# Patient Record
Sex: FEMALE | Race: OTHER | URBAN - METROPOLITAN AREA
[De-identification: names, ages, dates, MRNs, and addresses within clinical notes are randomized per-mention and may not be internally consistent; named-entity substitution may affect disease eponyms.]

---

## 2020-02-28 ENCOUNTER — TELEPHONE (OUTPATIENT)
Dept: SURGERY | Age: 50
End: 2020-02-28

## 2020-02-28 NOTE — TELEPHONE ENCOUNTER
Query for records. Called Dr. Kwadwo Albarran office for [facility]. Called PCP/referring provider for office notes/mammo related to [address] upcoming appointment scheduled with Dr. Tierra Anand. Detailed message left with medical records department with our office fax and phone provided. Will await fax.

## 2020-03-09 ENCOUNTER — OFFICE VISIT (OUTPATIENT)
Dept: SURGERY | Age: 50
End: 2020-03-09

## 2020-03-09 VITALS
OXYGEN SATURATION: 96 % | HEIGHT: 62 IN | RESPIRATION RATE: 18 BRPM | SYSTOLIC BLOOD PRESSURE: 138 MMHG | DIASTOLIC BLOOD PRESSURE: 87 MMHG | WEIGHT: 226 LBS | BODY MASS INDEX: 41.59 KG/M2 | TEMPERATURE: 96.5 F | HEART RATE: 67 BPM

## 2020-03-09 DIAGNOSIS — N61.0 CELLULITIS OF LEFT BREAST: Primary | ICD-10-CM

## 2020-03-09 RX ORDER — BUPROPION HYDROCHLORIDE 150 MG/1
TABLET ORAL
COMMUNITY
Start: 2019-12-11

## 2020-03-09 RX ORDER — LISINOPRIL 20 MG/1
1 TABLET ORAL
COMMUNITY

## 2020-03-09 RX ORDER — ATENOLOL AND CHLORTHALIDONE TABLET 100; 25 MG/1; MG/1
TABLET ORAL
COMMUNITY

## 2020-03-09 RX ORDER — DICYCLOMINE HYDROCHLORIDE 20 MG/1
TABLET ORAL
COMMUNITY

## 2020-03-09 NOTE — PATIENT INSTRUCTIONS
If you have and questions or concerns about today's appointment, the verbal and/or written instructions you were given for follow up care, please call our office at [phone].      [facility] - [address], [address] [place] [address]  Office: [phone]   Fax: [phone]

## 2020-03-09 NOTE — PROGRESS NOTES
Ms. Tim Blanton is a 51year old woman with left breast cellulitis. She reports a previous episode, now with a more recent episode. Both times there was improvement with antibiotics. There is no family history of breast cancer. She reports a negative mammogram in 2019 from [address] [place] [address]. I do not have this report/images yet, we are requesting. She has a history of bilateral reduction at age 13. The area of concern is at the inframammary scar line    Breast/GYN history:    OB History    No obstetric history on file. Past Medical History:   Diagnosis Date    Anxiety     Hypertension     IBS (irritable bowel syndrome)     Thyroid disease        Past Surgical History:   Procedure Laterality Date    HX APPENDECTOMY      carcinoid tumor    HX BREAST REDUCTION         Current Outpatient Medications on File Prior to Visit   Medication Sig Dispense Refill    levonorgestreL (MIRENA) 20 mcg/24 hours (5 yrs) 52 mg IUD Mirena 20 mcg/24 hours (5 yrs) 52 mg intrauterine device   Take 1 device by intrauterine route.  lisinopriL (PRINIVIL, ZESTRIL) 20 mg tablet Take 1 Tab by mouth.  atenoloL-chlorthalidone (TENORETIC) 100-25 mg per tablet atenolol 100 mg-chlorthalidone 25 mg tablet   TAKE 1 TABLET BY MOUTH EVERY DAY      buPROPion XL (WELLBUTRIN XL) 150 mg tablet TAKE 1 TABLET BY MOUTH EVERY DAY IN THE MORNING      dicyclomine (BENTYL) 20 mg tablet dicyclomine 20 mg tablet   TAKE 1 BY MOUTH EVERY 6 HOURS AS NEEDED FOR BLOATING / CRAMPS/ DIARRHEA       No current facility-administered medications on file prior to visit.         No Known Allergies    Social History     Tobacco Use    Smoking status: Former Smoker    Smokeless tobacco: Never Used   Substance Use Topics    Alcohol use: Yes     Frequency: 2-4 times a month    Drug use: Not on file       Family History   Problem Relation Age of Onset    Heart Disease Mother     Heart Disease Father          ROS: positives are bolded  General: fevers, chills, night sweats, fatigue, weight loss, weight gain  GI: nausea, vomiting, abdominal pain, change in bowel habits, hematochezia, melena, GERD  Integ: dermatitis, abnormal moles  HEENT: visual changes, vertigo, epistaxis, dysphagia, hoarseness  Cardiac: chest pain, palpitations, HTN, edema, varicosities  Resp: cough, shortness of breath, wheezing, hemoptysis, snoring, reactive airway disease  : hematuria, dysuria, frequency, urgency, nocturia, stress urinary incontinence   MSK: weakness, joint pain, arthritis  Endocrine:  thyroid disease, polyuria, polydipsia, polyphagia, poor wound healing, heat intolerance, cold intolerance  Lymph/Heme: anemia, bruising, history of blood transfusions  Neuro: dizziness, headache, fainting, seizures, stroke  Psych: anxiety, depression    Physical Exam:  Visit Vitals  /87 (BP 1 Location: Right arm, BP Patient Position: Sitting)   Pulse 67   Temp 96.5 °F (35.8 °C) (Oral)   Resp 18   Ht 5' 2\" (1.575 m)   Wt 102.5 kg (226 lb)   SpO2 96%   BMI 41.34 kg/m²       Gen:  No distress  Head: normocephalic, atraumatic  Mouth: Clear, no overt lesions, oral mucosa pink and moist  Neck: supple, no masses, no adenopathy, trachea midline  Resp: clear bilaterally  Cardio: Regular rate and rhythm  Abdomen: soft, nontender, nondistended  Extremeties: warm, well-perfused  Neuro: sensation and strength grossly intact and symmetrical  Psych: alert and oriented to person, place and time  Breasts:   Right: Examined in both the supine and upright positions. There was no supraclavicular, infraclavicular, or axillary lympadenopathy. There were no dominant masses, no skin changes, no asymmetry identified well healed reduction pattern scars  Left: Examined in both the supine and upright positions. There was no supraclavicular, infraclavicular, or axillary lympadenopathy.    There were no dominant masses, no skin changes, no asymmetry identified well healed reduction pattern scars, inframammary scar central/lateral with approx 1cm resolving erythema      Imaging:  Per patient 2019 [facility] negative    Impression:  Patient Active Problem List   Diagnosis Code    Cellulitis of left breast N640      51year old woman with left breast cellulitis, improving on antibiotics. We will obtain imaging from [facility]. We discussed repeat imaging vs conservative management with close follow up. Ms. Deborah Woody prefers observation. Follow up 1 month. If this area fails to resolve, will proceed with imaging at that time. If it remains focal, biopsy and/or excision may be recommended. All questions were answered. She was asked to call with any additional questions or concerns.       cc: Uriel Canseco MD

## 2020-03-09 NOTE — LETTER
3/10/2020 8:04 AM 
 
Patient:  Bonita Norwood YOB: 1970 Date of Visit: 3/9/2020 Fawad Corona MD 
[facility] [address] [address] VIA Facsimile: [phone] Austin Goode MD 
[address] [address] VIA Facsimile: [phone] Dear MD Austin Orourke MD, 
 
 
I had the pleasure of seeing Ms. Nata Monreal in my office today for her breast cellulitis. I am including a copy of my office visit today. If you have questions, please do not hesitate to call me. I look forward to following Ms. Michael Vargas along with you and will keep you updated as to her progress. Sincerely, Paige Mackey MD

## 2020-03-10 PROBLEM — N61.0 CELLULITIS OF LEFT BREAST: Status: ACTIVE | Noted: 2020-03-10

## 2022-03-18 PROBLEM — N61.0 CELLULITIS OF LEFT BREAST: Status: ACTIVE | Noted: 2020-03-10

## 2024-11-12 ENCOUNTER — HOSPITAL ENCOUNTER (OUTPATIENT)
Facility: HOSPITAL | Age: 54
Setting detail: SPECIMEN
Discharge: HOME | End: 2024-11-15

## 2024-11-12 ENCOUNTER — OFFICE VISIT (OUTPATIENT)
Facility: CLINIC | Age: 54
End: 2024-11-12

## 2024-11-12 VITALS
DIASTOLIC BLOOD PRESSURE: 75 MMHG | HEART RATE: 61 BPM | WEIGHT: 221 LBS | RESPIRATION RATE: 15 BRPM | OXYGEN SATURATION: 95 % | HEIGHT: 62 IN | TEMPERATURE: 98 F | BODY MASS INDEX: 40.67 KG/M2 | SYSTOLIC BLOOD PRESSURE: 115 MMHG

## 2024-11-12 DIAGNOSIS — M79.671 RIGHT FOOT PAIN: ICD-10-CM

## 2024-11-12 DIAGNOSIS — F41.9 ANXIETY AND DEPRESSION: ICD-10-CM

## 2024-11-12 DIAGNOSIS — Z76.89 ENCOUNTER TO ESTABLISH CARE: Primary | ICD-10-CM

## 2024-11-12 DIAGNOSIS — Z12.31 ENCOUNTER FOR SCREENING MAMMOGRAM FOR MALIGNANT NEOPLASM OF BREAST: ICD-10-CM

## 2024-11-12 DIAGNOSIS — Z13.220 SCREENING FOR CHOLESTEROL LEVEL: ICD-10-CM

## 2024-11-12 DIAGNOSIS — I10 ESSENTIAL (PRIMARY) HYPERTENSION: ICD-10-CM

## 2024-11-12 DIAGNOSIS — Z13.89 SCREENING FOR BLOOD OR PROTEIN IN URINE: ICD-10-CM

## 2024-11-12 DIAGNOSIS — Z13.29 SCREENING FOR THYROID DISORDER: ICD-10-CM

## 2024-11-12 DIAGNOSIS — M76.61 ACHILLES TENDINITIS OF RIGHT LOWER EXTREMITY: ICD-10-CM

## 2024-11-12 DIAGNOSIS — Z13.1 SCREENING FOR DIABETES MELLITUS (DM): ICD-10-CM

## 2024-11-12 DIAGNOSIS — Z13.228 SCREENING FOR METABOLIC DISORDER: ICD-10-CM

## 2024-11-12 DIAGNOSIS — Z12.11 SCREENING FOR COLON CANCER: ICD-10-CM

## 2024-11-12 DIAGNOSIS — Z13.0 SCREENING FOR DEFICIENCY ANEMIA: ICD-10-CM

## 2024-11-12 DIAGNOSIS — F32.A ANXIETY AND DEPRESSION: ICD-10-CM

## 2024-11-12 LAB — SENTARA SPECIMEN COLLECTION: NORMAL

## 2024-11-12 PROCEDURE — 99001 SPECIMEN HANDLING PT-LAB: CPT

## 2024-11-12 RX ORDER — ATENOLOL AND CHLORTHALIDONE TABLET 100; 25 MG/1; MG/1
1 TABLET ORAL DAILY
Qty: 90 TABLET | Refills: 3 | Status: SHIPPED | OUTPATIENT
Start: 2024-11-12

## 2024-11-12 RX ORDER — ESCITALOPRAM OXALATE 20 MG/1
20 TABLET ORAL DAILY
Qty: 90 TABLET | Refills: 1 | Status: SHIPPED | OUTPATIENT
Start: 2024-11-12

## 2024-11-12 RX ORDER — ESCITALOPRAM OXALATE 20 MG/1
20 TABLET ORAL DAILY
COMMUNITY
End: 2024-11-12 | Stop reason: SDUPTHER

## 2024-11-12 RX ORDER — BUPROPION HYDROCHLORIDE 150 MG/1
150 TABLET ORAL EVERY MORNING
Qty: 90 TABLET | Refills: 1 | Status: SHIPPED | OUTPATIENT
Start: 2024-11-12

## 2024-11-12 SDOH — ECONOMIC STABILITY: FOOD INSECURITY: WITHIN THE PAST 12 MONTHS, YOU WORRIED THAT YOUR FOOD WOULD RUN OUT BEFORE YOU GOT MONEY TO BUY MORE.: NEVER TRUE

## 2024-11-12 SDOH — ECONOMIC STABILITY: INCOME INSECURITY: HOW HARD IS IT FOR YOU TO PAY FOR THE VERY BASICS LIKE FOOD, HOUSING, MEDICAL CARE, AND HEATING?: NOT HARD AT ALL

## 2024-11-12 SDOH — ECONOMIC STABILITY: FOOD INSECURITY: WITHIN THE PAST 12 MONTHS, THE FOOD YOU BOUGHT JUST DIDN'T LAST AND YOU DIDN'T HAVE MONEY TO GET MORE.: NEVER TRUE

## 2024-11-12 ASSESSMENT — PATIENT HEALTH QUESTIONNAIRE - PHQ9
SUM OF ALL RESPONSES TO PHQ QUESTIONS 1-9: 0
2. FEELING DOWN, DEPRESSED OR HOPELESS: NOT AT ALL
SUM OF ALL RESPONSES TO PHQ QUESTIONS 1-9: 0
SUM OF ALL RESPONSES TO PHQ9 QUESTIONS 1 & 2: 0
1. LITTLE INTEREST OR PLEASURE IN DOING THINGS: NOT AT ALL
SUM OF ALL RESPONSES TO PHQ QUESTIONS 1-9: 0
SUM OF ALL RESPONSES TO PHQ QUESTIONS 1-9: 0

## 2024-11-12 NOTE — PATIENT INSTRUCTIONS
[facility]  Address: [address] [place] [address]  Phone: [phone]

## 2024-11-12 NOTE — PROGRESS NOTES
Michelle Mon is a 54 y.o. year old female who presents today for   Chief Complaint   Patient presents with    Establish Care       \"Have you been to the ER, urgent care clinic since your last visit?  Hospitalized since your last visit?\"    No    “Have you seen or consulted any other health care providers outside our system since your last visit?”         Have you had a mammogram?”   [facility]    No breast cancer screening on file      “Have you had a pap smear?”    [facility]. Will request    Date of last Cervical Cancer screen (HPV or PAP): 12/16/2013     “Have you had a colorectal cancer screening such as a colonoscopy/FIT/Cologuard?    [facility]     No colonoscopy on file  No cologuard on file  No FIT/FOBT on file   No flexible sigmoidoscopy on file         Click Here for Release of Records Request    - JORGE Wilkerson  [facility]  Phone: [phone]  Fax: [phone]

## 2024-11-12 NOTE — PROGRESS NOTES
Michelle Mon is a 54 y.o. female  NEW patient, here for evaluation of the following chief complaint(s):  Chief Complaint   Patient presents with    Establish Care      Assessment and Plan  1. Encounter to establish care  2. Encounter for screening mammogram for malignant neoplasm of breast  -     LELE DIGITAL SCREEN W OR WO CAD BILATERAL; Future  3. Screening for colon cancer  4. Screening for diabetes mellitus (DM)  -     Hemoglobin A1C; Future  5. Screening for blood or protein in urine  -     Urinalysis with Microscopic; Future  6. Screening for metabolic disorder  -     Comprehensive Metabolic Panel; Future  7. Screening for cholesterol level  -     Lipid Panel; Future  8. Screening for thyroid disorder  -     TSH + Free T4 Panel; Future  9. Screening for deficiency anemia  -     CBC with Auto Differential; Future  10. Anxiety and depression  -     escitalopram (LEXAPRO) 20 MG tablet; Take 1 tablet by mouth daily, Disp-90 tablet, R-1Normal  -     buPROPion (WELLBUTRIN XL) 150 MG extended release tablet; Take 1 tablet by mouth every morning, Disp-90 tablet, R-1Normal  11. Essential (primary) hypertension  Comments:  controlled, no changes  Orders:  -     atenolol-chlorthalidone (TENORETIC) 100-25 MG per tablet; Take 1 tablet by mouth daily, Disp-90 tablet, R-3Normal  12. Achilles tendinitis of right lower extremity  -     [facility] - CIRILO Ross MD, Orthopedic Surgery(General/Foot & Ankle), [place] ([facility])  -     XR FOOT RIGHT (MIN 3 VIEWS); Future  13. Right foot pain  -     XR FOOT RIGHT (MIN 3 VIEWS); Future       Return in about 1 month (around 12/12/2024) for results, exam, 30.   HPI:   In office visit to [facility]. Pt is well. 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 She would like to see a specialist.     ROS:    General: negative for - chills, fever, weight changes or

## 2024-11-13 LAB
A/G RATIO: 2 RATIO (ref 1.1–2.6)
ALBUMIN: 4.3 G/DL (ref 3.5–5)
ALP BLD-CCNC: 59 U/L (ref 25–115)
ALT SERPL-CCNC: 20 U/L (ref 5–40)
ANION GAP SERPL CALCULATED.3IONS-SCNC: 10 MMOL/L (ref 3–15)
AST SERPL-CCNC: 18 U/L (ref 10–37)
BACTERIA: NEGATIVE
BASOPHILS ABSOLUTE: 0 K/UL (ref 0–0.2)
BASOPHILS RELATIVE PERCENT: 0 % (ref 0–2)
BILIRUB SERPL-MCNC: 0.7 MG/DL (ref 0.2–1.2)
BILIRUB SERPL-MCNC: NEGATIVE MG/DL
BLOOD: NEGATIVE
BUN BLDV-MCNC: 13 MG/DL (ref 6–22)
CALCIUM SERPL-MCNC: 9.6 MG/DL (ref 8.4–10.5)
CHLORIDE BLD-SCNC: 102 MMOL/L (ref 98–110)
CHOLESTEROL, TOTAL: 198 MG/DL (ref 110–200)
CHOLESTEROL/HDL RATIO: 4 (ref 0–5)
CLARITY, UA: CLEAR
CO2: 28 MMOL/L (ref 20–32)
COLOR, UA: YELLOW
CREAT SERPL-MCNC: 0.7 MG/DL (ref 0.5–1.2)
EOSINOPHIL # BLD: 1 % (ref 0–6)
EOSINOPHILS ABSOLUTE: 0.1 K/UL (ref 0–0.5)
EPITHELIAL CELLS: ABNORMAL /HPF
ESTIMATED AVERAGE GLUCOSE: 105 MG/DL (ref 91–123)
GFR, ESTIMATED: >60 ML/MIN/1.73 SQ.M.
GLOBULIN: 2.1 G/DL (ref 2–4)
GLUCOSE: 99 MG/DL (ref 70–99)
GLUCOSE: NEGATIVE MG/DL
HBA1C MFR BLD: 5.3 % (ref 4.8–5.6)
HCT VFR BLD CALC: 40.6 % (ref 35.1–48)
HDLC SERPL-MCNC: 50 MG/DL
HEMOGLOBIN: 13.8 G/DL (ref 11.7–16)
HYALINE CASTS: ABNORMAL /LPF (ref 0–2)
KETONES, URINE: NEGATIVE MG/DL
LDL CHOLESTEROL: 97 MG/DL (ref 50–99)
LDL/HDL RATIO: 2
LEUKOCYTE ESTERASE, URINE: ABNORMAL
LYMPHOCYTES # BLD: 39 % (ref 20–45)
LYMPHOCYTES ABSOLUTE: 2.6 K/UL (ref 1–4.8)
MCH RBC QN AUTO: 33 PG (ref 26–34)
MCHC RBC AUTO-ENTMCNC: 34 G/DL (ref 31–36)
MCV RBC AUTO: 96 FL (ref 80–99)
MONOCYTES ABSOLUTE: 0.4 K/UL (ref 0.1–1)
MONOCYTES: 6 % (ref 3–12)
NEUTROPHILS ABSOLUTE: 3.6 K/UL (ref 1.8–7.7)
NEUTROPHILS: 53 % (ref 40–75)
NITRITE, URINE: NEGATIVE
NON-HDL CHOLESTEROL: 148 MG/DL
PDW BLD-RTO: 13.4 % (ref 10–15.5)
PH, URINE: 6.5 PH (ref 5–8)
PLATELET # BLD: 370 K/UL (ref 140–440)
PMV BLD AUTO: 10.8 FL (ref 9–13)
POTASSIUM SERPL-SCNC: 3.9 MMOL/L (ref 3.5–5.5)
PROTEIN UA: NEGATIVE MG/DL
RBC # BLD: 4.24 M/UL (ref 3.8–5.2)
RBC URINE: ABNORMAL /HPF
SODIUM BLD-SCNC: 140 MMOL/L (ref 133–145)
SPECIFIC GRAVITY UA: 1.01 (ref 1–1.03)
T4 FREE: 1.2 NG/DL (ref 0.9–1.8)
TOTAL PROTEIN: 6.4 G/DL (ref 6.4–8.3)
TRIGL SERPL-MCNC: 251 MG/DL (ref 40–149)
TSH SERPL DL<=0.05 MIU/L-ACNC: 0.4 MCU/ML (ref 0.27–4.2)
UROBILINOGEN, URINE: 0.2 MG/DL
VLDLC SERPL CALC-MCNC: 50 MG/DL (ref 8–30)
WBC # BLD: 6.7 K/UL (ref 4–11)
WBC UA: ABNORMAL /HPF (ref 0–5)

## 2024-12-17 ENCOUNTER — OFFICE VISIT (OUTPATIENT)
Age: 54
End: 2024-12-17

## 2024-12-17 VITALS — WEIGHT: 220 LBS | HEIGHT: 62 IN | BODY MASS INDEX: 40.48 KG/M2

## 2024-12-17 DIAGNOSIS — M67.88 ACHILLES TENDINOSIS: Primary | ICD-10-CM

## 2024-12-17 NOTE — PROGRESS NOTES
Michelle BLACKWELL Elieser   [address] [facility] [address]       DIAGNOSTIC IMAGING      Orders Placed This Encounter   Procedures    [[id number]] Ankle Min 3V     right     Order Specific Question:   Are you Pregnant?     Answer:   No        X RAYS AT [facility]  12/17/2024     RIGHT ANKLE X-rays,NON WEIGHT BEARING  3 views, AP/LAT/OBL completed 12/17/2024  AT [facility]     X-rays reveal Osseous: No fractures or dislocations. No focal osteolytic or osteoblastic process. Bone Spurs: MODERATE ACHILLES INSERTIONAL bone spurs. Overall alignment is acceptable. Ankle mortise alignment is congruent, Tibial plafond and talar dome intact.   Soft tissue swelling is not noted, No radiopaque foreign body and No abnormal calcific densities to soft tissues. No osteolytic or osteoblastic lesions noted, no projection x-ray images. Mineralization suggests no osteopenia. Degenerative changes/Joint Condition: No Significant OA is not noted. No Osteochondral defects see. Calcified vessels are not present.     I have personally reviewed the results of the above study and the interpretation of this study is my professional opinion.        Daniel Ross MD  12/17/2024  8:44 AM

## 2024-12-17 NOTE — PROGRESS NOTES
AMBULATORY PROGRESS NOTE      Patient: Michelle Mon             MRN: [id number]     SSN: [id number] Body mass index is 40.24 kg/m².  YOB: 1970     AGE: 54 y.o.       EX: female    PCP: Candace Newberry APRN - CNP       IMPRESSION //  DIAGNOSIS AND TREATMENT PLAN      Michelle Mon has a diagnosis of:      In this individual, who has been doing dedicated stretching, taking anti-inflammatories, doing stretching, for 8 months duration, is having worsening pain discomfort, to her right hindfoot, my pression is insertional right Achilles tendinosis, right Achilles insertional tendinitis: Recommendation comes MRI, to give me a volumetric assessment of the footprint of the right Achilles tendon insertion point.    Will give her home exercise eccentric stretching program, I did talk about surgical treatment options, for this, and this intervention, has had pain discomfort, for last 8 months.    DIAGNOSES    1. Achilles tendinosis          PLAN:    1. Ordered Right Ankle MRI WO Contrast to assess achilles tendon      RTO after MRI    Orders Placed This Encounter    [[id number]] Ankle Min 3V     right     Order Specific Question:   Are you Pregnant?     Answer:   No        There are no Patient Instructions on file for this visit.      Please follow up with your PCP for any health maintenance as recommended.         Michelle Mon  expresses understanding of the diagnosis, treatment plan, and all of their proposed questions were answered to their satisfaction. Patient education has been provided re the diagnoses.         HPI //  OBJECTIVE EXAMINATION      Michelle Mon IS A 54 y.o. female who is a/an  new patient, presenting to my outpatient office for evaluation of  the following chief complaint(s):     Chief Complaint   Patient presents with    Foot Pain     Right achilles       Patient presents with right achilles pain. She noticed protrusion from the distal achilles region 8 months ago that causes

## 2024-12-17 NOTE — PROGRESS NOTES
Patient here today complaining of right achilles tendon pain.  Patient states pain is persistent at 8/10.  No prior treatment other than HEP.  Will obtain x-rays of right ankle today.

## 2025-01-02 ENCOUNTER — HOSPITAL ENCOUNTER (OUTPATIENT)
Facility: HOSPITAL | Age: 55
Discharge: HOME | End: 2025-01-02
Attending: ORTHOPAEDIC SURGERY
Payer: COMMERCIAL

## 2025-01-02 DIAGNOSIS — M67.88 ACHILLES TENDINOSIS: ICD-10-CM

## 2025-01-02 PROCEDURE — 73721 MRI JNT OF LWR EXTRE W/O DYE: CPT

## 2025-04-09 ENCOUNTER — OFFICE VISIT (OUTPATIENT)
Age: 55
End: 2025-04-09
Payer: COMMERCIAL

## 2025-04-09 DIAGNOSIS — M67.88 ACHILLES TENDINOSIS: Primary | ICD-10-CM

## 2025-04-09 PROCEDURE — 99213 OFFICE O/P EST LOW 20 MIN: CPT | Performed by: ORTHOPAEDIC SURGERY

## 2025-04-09 NOTE — PATIENT INSTRUCTIONS
-Eccentric stretching of the achilles against wall.  -Gastroc Release or Graston stretching/technique.

## 2025-04-09 NOTE — PROGRESS NOTES
// Hearings Intact  Respiratory: Breathing non labored     RIGHT ANKLE/FOOT    Gait: slight limping   Tenderness: moderate to ACHILLES TENDON  Cutaneous: WNL.  Joint Motion: not tested  Joint / Tendon Stability:  No Ankle or Subtalar instability or joint laxity.                       No peroneal sublux ability or dislocation  Alignment: neutral Hindfoot,    Neuro Motor/Sensory: NL/NL  Vascular: NL foot/ankle pulses,   Lymphatics: No extremity lymphedema, No calf swelling, no tenderness to calf muscles.    RADIOGRAPHS// IMAGING//DIAGNOSTIC DATA     No orders of the defined types were placed in this encounter.           MRI Result (most recent):  MRI ANKLE RIGHT WO CONTRAST 01/02/2025    Narrative  EXAM: MRI ANKLE RIGHT WO CONTRAST    CLINICAL INDICATION/HISTORY: Other specified disorders of synovium and tendon,  other site    COMPARISON: None    TECHNIQUE: Multiplanar, multisequence MR imaging of the right ankle    FINDINGS:    BONE/JOINT: Faint bone marrow edema within the posterior/superior calcaneus.  There is also some subchondral edema within the cuboid and the calcaneus along  the calcaneocuboid joint space laterally. There is focal cartilage loss at this  location. Otherwise grossly intact cartilage. No joint effusion.    LATERAL LIGAMENTS: Intact    MEDIAL LIGAMENTS: Intact    TENDONS  Achilles: Thickening of the distal Achilles tendon measuring 9 mm in AP  dimension. No discrete tendon tear. Soft tissue edema around the Achilles  tendon. Retrocalcaneal bursal fluid.  Peroneals: Unremarkable  Flexor: Unremarkable  Extensor: Unremarkable    SINUS TARSI: Clear    PLANTAR FASCIA: Unremarkable.    MUSCLES: No muscle edema or atrophy.    TARSAL TUNNEL: Unremarkable.    OTHER: Unremarkable.    Impression  Moderate insertional Achilles tendinosis with surrounding soft tissue and bone  marrow edema as well as retrocalcaneal bursitis. No discrete tendon tear.    Small area of focal high-grade cartilage loss at the

## 2025-04-17 DIAGNOSIS — F32.A ANXIETY AND DEPRESSION: ICD-10-CM

## 2025-04-17 DIAGNOSIS — I10 ESSENTIAL (PRIMARY) HYPERTENSION: ICD-10-CM

## 2025-04-17 DIAGNOSIS — F41.9 ANXIETY AND DEPRESSION: ICD-10-CM

## 2025-04-17 NOTE — TELEPHONE ENCOUNTER
Pt called to request med refills for the following:    Atenolol-chlorthalidone (TENORETIC) 100-25 MG per tablet [[id number]]     Escitalopram (LEXAPRO) 20 MG tablet [[id number]]     Lisinopril (PRINIVIL;ZESTRIL) 20 MG tablet [[id number]]     LOV:  11/24/2024  NOV:

## 2025-04-21 ENCOUNTER — TELEPHONE (OUTPATIENT)
Facility: CLINIC | Age: 55
End: 2025-04-21

## 2025-04-21 RX ORDER — LISINOPRIL 20 MG/1
20 TABLET ORAL DAILY
Qty: 90 TABLET | Refills: 0 | Status: SHIPPED | OUTPATIENT
Start: 2025-04-21

## 2025-04-21 RX ORDER — ATENOLOL AND CHLORTHALIDONE TABLET 100; 25 MG/1; MG/1
1 TABLET ORAL DAILY
Qty: 90 TABLET | Refills: 0 | Status: SHIPPED | OUTPATIENT
Start: 2025-04-21

## 2025-04-21 RX ORDER — ESCITALOPRAM OXALATE 20 MG/1
20 TABLET ORAL DAILY
Qty: 90 TABLET | Refills: 0 | Status: SHIPPED | OUTPATIENT
Start: 2025-04-21

## 2025-04-21 NOTE — TELEPHONE ENCOUNTER
LVM on mobile at 1:20 pm to request patient follow up in office for Hypertension and Annual Exam please be advised, thank you.

## 2025-06-02 ENCOUNTER — OFFICE VISIT (OUTPATIENT)
Facility: CLINIC | Age: 55
End: 2025-06-02
Payer: COMMERCIAL

## 2025-06-02 VITALS
WEIGHT: 223 LBS | HEIGHT: 62 IN | BODY MASS INDEX: 41.04 KG/M2 | RESPIRATION RATE: 16 BRPM | OXYGEN SATURATION: 95 % | DIASTOLIC BLOOD PRESSURE: 87 MMHG | TEMPERATURE: 98.2 F | HEART RATE: 63 BPM | SYSTOLIC BLOOD PRESSURE: 128 MMHG

## 2025-06-02 DIAGNOSIS — M25.511 ACUTE PAIN OF RIGHT SHOULDER: ICD-10-CM

## 2025-06-02 DIAGNOSIS — Z87.39 HISTORY OF GOUT: ICD-10-CM

## 2025-06-02 DIAGNOSIS — Z00.00 ANNUAL PHYSICAL EXAM: Primary | ICD-10-CM

## 2025-06-02 DIAGNOSIS — F41.9 ANXIETY AND DEPRESSION: ICD-10-CM
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
## 2025-06-02 DIAGNOSIS — F32.A ANXIETY AND DEPRESSION: ICD-10-CM

## 2025-06-02 DIAGNOSIS — I10 ESSENTIAL (PRIMARY) HYPERTENSION: ICD-10-CM

## 2025-06-02 DIAGNOSIS — E78.2 ELEVATED CHOLESTEROL WITH ELEVATED TRIGLYCERIDES: ICD-10-CM

## 2025-06-02 DIAGNOSIS — L98.9 SKIN LESION: ICD-10-CM

## 2025-06-02 PROCEDURE — 3079F DIAST BP 80-89 MM HG: CPT | Performed by: NURSE PRACTITIONER

## 2025-06-02 PROCEDURE — 99396 PREV VISIT EST AGE 40-64: CPT | Performed by: NURSE PRACTITIONER

## 2025-06-02 PROCEDURE — 3074F SYST BP LT 130 MM HG: CPT | Performed by: NURSE PRACTITIONER

## 2025-06-02 RX ORDER — ATENOLOL AND CHLORTHALIDONE TABLET 100; 25 MG/1; MG/1
1 TABLET ORAL DAILY
Qty: 90 TABLET | Refills: 2 | Status: SHIPPED | OUTPATIENT
Start: 2025-06-02

## 2025-06-02 RX ORDER — BUPROPION HYDROCHLORIDE 150 MG/1
150 TABLET ORAL EVERY MORNING
Qty: 90 TABLET | Refills: 1 | Status: SHIPPED | OUTPATIENT
Start: 2025-06-02

## 2025-06-02 RX ORDER — ESCITALOPRAM OXALATE 20 MG/1
20 TABLET ORAL DAILY
Qty: 90 TABLET | Refills: 1 | Status: SHIPPED | OUTPATIENT
Start: 2025-06-02

## 2025-06-02 SDOH — ECONOMIC STABILITY: FOOD INSECURITY: WITHIN THE PAST 12 MONTHS, THE FOOD YOU BOUGHT JUST DIDN'T LAST AND YOU DIDN'T HAVE MONEY TO GET MORE.: NEVER TRUE

## 2025-06-02 SDOH — ECONOMIC STABILITY: FOOD INSECURITY: WITHIN THE PAST 12 MONTHS, YOU WORRIED THAT YOUR FOOD WOULD RUN OUT BEFORE YOU GOT MONEY TO BUY MORE.: NEVER TRUE

## 2025-06-02 ASSESSMENT — PATIENT HEALTH QUESTIONNAIRE - PHQ9
SUM OF ALL RESPONSES TO PHQ QUESTIONS 1-9: 0
2. FEELING DOWN, DEPRESSED OR HOPELESS: NOT AT ALL
9. THOUGHTS THAT YOU WOULD BE BETTER OFF DEAD, OR OF HURTING YOURSELF: NOT AT ALL
7. TROUBLE CONCENTRATING ON THINGS, SUCH AS READING THE NEWSPAPER OR WATCHING TELEVISION: NOT AT ALL
SUM OF ALL RESPONSES TO PHQ QUESTIONS 1-9: 0
8. MOVING OR SPEAKING SO SLOWLY THAT OTHER PEOPLE COULD HAVE NOTICED. OR THE OPPOSITE, BEING SO FIGETY OR RESTLESS THAT YOU HAVE BEEN MOVING AROUND A LOT MORE THAN USUAL: NOT AT ALL
3. TROUBLE FALLING OR STAYING ASLEEP: NOT AT ALL
6. FEELING BAD ABOUT YOURSELF - OR THAT YOU ARE A FAILURE OR HAVE LET YOURSELF OR YOUR FAMILY DOWN: NOT AT ALL
SUM OF ALL RESPONSES TO PHQ QUESTIONS 1-9: 0
5. POOR APPETITE OR OVEREATING: NOT AT ALL
10. IF YOU CHECKED OFF ANY PROBLEMS, HOW DIFFICULT HAVE THESE PROBLEMS MADE IT FOR YOU TO DO YOUR WORK, TAKE CARE OF THINGS AT HOME, OR GET ALONG WITH OTHER PEOPLE: NOT DIFFICULT AT ALL
SUM OF ALL RESPONSES TO PHQ QUESTIONS 1-9: 0
1. LITTLE INTEREST OR PLEASURE IN DOING THINGS: NOT AT ALL
4. FEELING TIRED OR HAVING LITTLE ENERGY: NOT AT ALL

## 2025-06-02 NOTE — PROGRESS NOTES
Michelle Mon is a 54 y.o. female  established patient, here for evaluation of the following chief complaint(s):  Chief Complaint   Patient presents with    Annual Exam      Assessment and Plan  1. Annual physical exam  2. Skin lesion  -     External Referral To Dermatology  3. Acute pain of right shoulder  -     MRI SHOULDER RIGHT WO CONTRAST; Future  4. Fall, initial encounter  -     MRI SHOULDER RIGHT WO CONTRAST; Future  5. Elevated cholesterol with elevated triglycerides  Comments:  make dietary changes  6. History of gout  7. Essential (primary) hypertension  Comments:  controlled, no changes  Orders:  -     atenolol-chlorthalidone (TENORETIC) 100-25 MG per tablet; Take 1 tablet by mouth daily, Disp-90 tablet, R-2Normal  8. Anxiety and depression  -     buPROPion (WELLBUTRIN XL) 150 MG extended release tablet; Take 1 tablet by mouth every morning, Disp-90 tablet, R-1Normal  -     escitalopram (LEXAPRO) 20 MG tablet; Take 1 tablet by mouth daily, Disp-90 tablet, R-1Normal       Return in about 6 months (around 12/2/2025), or if symptoms worsen or fail to improve, for Follow-up with your PCP.   HPI:   In office visit for annual exam. She has started a new job at [employer]. She will not have insurance for 2 months. She has a glass of white every night and more on the weekend.     Pt fell on Mother's day and hurt her right shoulder. She feels her right shoulder has been \"popping in and out of joint.\" She does not want a MRI yet because she has no insurance. She has been taping her shoulder. Her partner is a chiropractor.     Hypertension  On atenolol-chlorthalidone 100-25 mg daily, lisinopril 20 mg. Controlled.    Mood disorder  On Lexapro 20 mg, Wellbutrin 150 mg    She will get her mammogram when she has insurance.   Discussed HIV and hep C screening  Discussed cervical cancer screening  Discussed Prevnar 20    Health maintenance  Breast cancer screening: At 41 years old  Cervical cancer screening: sees

## 2025-06-02 NOTE — PATIENT INSTRUCTIONS
[facility]   [place]  Address: [address] [place] [address]  Phone: [phone]     [facility] [phone]  Please let me know when you complete your imaging

## 2025-07-21 ENCOUNTER — OFFICE VISIT (OUTPATIENT)
Facility: CLINIC | Age: 55
End: 2025-07-21
Payer: COMMERCIAL

## 2025-07-21 VITALS
WEIGHT: 223 LBS | DIASTOLIC BLOOD PRESSURE: 83 MMHG | HEIGHT: 62 IN | RESPIRATION RATE: 16 BRPM | SYSTOLIC BLOOD PRESSURE: 129 MMHG | BODY MASS INDEX: 41.04 KG/M2 | OXYGEN SATURATION: 95 % | TEMPERATURE: 97.2 F | HEART RATE: 57 BPM

## 2025-07-21 DIAGNOSIS — Z82.49 FAMILY HISTORY OF HEART ATTACK: ICD-10-CM

## 2025-07-21 DIAGNOSIS — I10 ESSENTIAL (PRIMARY) HYPERTENSION: ICD-10-CM

## 2025-07-21 DIAGNOSIS — R40.0 DAYTIME SLEEPINESS: ICD-10-CM

## 2025-07-21 DIAGNOSIS — F32.A ANXIETY AND DEPRESSION: ICD-10-CM

## 2025-07-21 DIAGNOSIS — E78.00 ELEVATED LDL CHOLESTEROL LEVEL: ICD-10-CM

## 2025-07-21 DIAGNOSIS — E66.813 CLASS 3 SEVERE OBESITY DUE TO EXCESS CALORIES WITH SERIOUS COMORBIDITY AND BODY MASS INDEX (BMI) OF 40.0 TO 44.9 IN ADULT (HCC): ICD-10-CM

## 2025-07-21 DIAGNOSIS — R06.83 SNORES: Primary | ICD-10-CM

## 2025-07-21 DIAGNOSIS — E78.1 HYPERTRIGLYCERIDEMIA: ICD-10-CM

## 2025-07-21 DIAGNOSIS — F41.9 ANXIETY AND DEPRESSION: ICD-10-CM

## 2025-07-21 DIAGNOSIS — M79.601 RIGHT ARM PAIN: ICD-10-CM

## 2025-07-21 DIAGNOSIS — R94.31 ABNORMAL EKG: ICD-10-CM

## 2025-07-21 PROCEDURE — 99214 OFFICE O/P EST MOD 30 MIN: CPT | Performed by: NURSE PRACTITIONER

## 2025-07-21 PROCEDURE — 3079F DIAST BP 80-89 MM HG: CPT | Performed by: NURSE PRACTITIONER

## 2025-07-21 PROCEDURE — 3074F SYST BP LT 130 MM HG: CPT | Performed by: NURSE PRACTITIONER

## 2025-07-21 PROCEDURE — 93000 ELECTROCARDIOGRAM COMPLETE: CPT | Performed by: NURSE PRACTITIONER

## 2025-07-21 RX ORDER — PRAVASTATIN SODIUM 40 MG
40 TABLET ORAL DAILY
Qty: 90 TABLET | Refills: 1 | Status: SHIPPED | OUTPATIENT
Start: 2025-07-21

## 2025-07-21 RX ORDER — ASPIRIN 81 MG/1
81 TABLET ORAL DAILY
Qty: 90 TABLET | Refills: 3 | Status: SHIPPED | OUTPATIENT
Start: 2025-07-21

## 2025-07-21 RX ORDER — NITROGLYCERIN 0.4 MG/1
0.4 TABLET SUBLINGUAL EVERY 5 MIN PRN
Qty: 25 TABLET | Refills: 1 | Status: SHIPPED | OUTPATIENT
Start: 2025-07-21

## 2025-07-21 RX ORDER — BUPROPION HYDROCHLORIDE 150 MG/1
300 TABLET ORAL EVERY MORNING
Qty: 180 TABLET | Refills: 1 | Status: SHIPPED | OUTPATIENT
Start: 2025-07-21

## 2025-07-21 NOTE — PROGRESS NOTES
Michelle Mon is a 55 y.o. female  established patient, here for evaluation of the following chief complaint(s):  Chief Complaint   Patient presents with    Neck Pain    Arm Pain      Assessment and Plan  1. Snores  -     [facility] - Brittany Cheema MD Sleep Medicine ([facility])  -     Vamsi Le MD, Cardiology, [place] ([address])  2. Anxiety and depression  -     buPROPion (WELLBUTRIN XL) 150 MG extended release tablet; Take 2 tablets by mouth every morning, Disp-180 tablet, R-1Normal  3. Daytime sleepiness  -     [facility] Brittany Blanca MD Sleep Medicine ([facility])  -     Vamsi Le MD, Cardiology, [place] ([address])  4. Essential (primary) hypertension  -     [facility] Brittany Blanca MD Sleep Medicine ([facility])  -     Vamsi Fagan MD, Cardiology, [place] ([address])  -     aspirin 81 MG EC tablet; Take 1 tablet by mouth daily, Disp-90 tablet, R-3Normal  5. Class 3 severe obesity due to excess calories with serious comorbidity and body mass index (BMI) of 40.0 to 44.9 in adult (HCC)  -     [facility] Brittany Blanca MD Sleep Medicine ([facility])  -     [facility]  6. Right arm pain  -     EKG 12 lead; Future  -     [facility]  -     aspirin 81 MG EC tablet; Take 1 tablet by mouth daily, Disp-90 tablet, R-3Normal  -     nitroGLYCERIN (NITROSTAT) 0.4 MG SL tablet; Place 1 tablet under the tongue every 5 minutes as needed for Chest pain up to max of 3 total doses. If no relief after 1 dose, call 911., Disp-25 tablet, R-1Normal  7. Hypertriglyceridemia  -     pravastatin (PRAVACHOL) 40 MG tablet; Take 1 tablet by mouth daily, Disp-90 tablet, R-1Normal  -     [facility]  -     aspirin 81 MG EC tablet; Take 1 tablet by mouth daily, Disp-90 tablet, R-3Normal  8. Elevated LDL cholesterol level  -     pravastatin (PRAVACHOL) 40 MG tablet; Take 1 tablet by mouth daily,

## 2025-07-23 ENCOUNTER — TELEPHONE (OUTPATIENT)
Age: 55
End: 2025-07-23